# Patient Record
Sex: FEMALE | Race: WHITE | NOT HISPANIC OR LATINO | Employment: FULL TIME | URBAN - METROPOLITAN AREA
[De-identification: names, ages, dates, MRNs, and addresses within clinical notes are randomized per-mention and may not be internally consistent; named-entity substitution may affect disease eponyms.]

---

## 2017-11-10 ENCOUNTER — TRANSCRIBE ORDERS (OUTPATIENT)
Dept: ADMINISTRATIVE | Facility: HOSPITAL | Age: 57
End: 2017-11-10

## 2017-11-10 DIAGNOSIS — Z12.31 VISIT FOR SCREENING MAMMOGRAM: Primary | ICD-10-CM

## 2017-12-22 ENCOUNTER — APPOINTMENT (OUTPATIENT)
Dept: MAMMOGRAPHY | Facility: HOSPITAL | Age: 57
End: 2017-12-22

## 2018-10-16 ENCOUNTER — APPOINTMENT (OUTPATIENT)
Dept: GENERAL RADIOLOGY | Facility: HOSPITAL | Age: 58
End: 2018-10-16

## 2018-10-16 ENCOUNTER — HOSPITAL ENCOUNTER (EMERGENCY)
Facility: HOSPITAL | Age: 58
Discharge: HOME | End: 2018-10-16
Attending: EMERGENCY MEDICINE | Admitting: EMERGENCY MEDICINE

## 2018-10-16 VITALS
OXYGEN SATURATION: 96 % | WEIGHT: 145 LBS | HEIGHT: 63 IN | SYSTOLIC BLOOD PRESSURE: 132 MMHG | BODY MASS INDEX: 25.69 KG/M2 | RESPIRATION RATE: 16 BRPM | TEMPERATURE: 97.7 F | HEART RATE: 69 BPM | DIASTOLIC BLOOD PRESSURE: 78 MMHG

## 2018-10-16 DIAGNOSIS — M25.511 ACUTE PAIN OF RIGHT SHOULDER: Primary | ICD-10-CM

## 2018-10-16 PROCEDURE — 63710000001 PREDNISONE PER 1 MG: Performed by: PHYSICIAN ASSISTANT

## 2018-10-16 PROCEDURE — 99284 EMERGENCY DEPT VISIT MOD MDM: CPT

## 2018-10-16 PROCEDURE — 73030 X-RAY EXAM OF SHOULDER: CPT

## 2018-10-16 RX ORDER — METHYLPREDNISOLONE 4 MG/1
TABLET ORAL
Qty: 21 TABLET | Refills: 0 | Status: SHIPPED | OUTPATIENT
Start: 2018-10-16 | End: 2018-10-21

## 2018-10-16 RX ORDER — TRAMADOL HYDROCHLORIDE 50 MG/1
50 TABLET ORAL EVERY 6 HOURS PRN
Qty: 12 TABLET | Refills: 0 | Status: SHIPPED | OUTPATIENT
Start: 2018-10-16

## 2018-10-16 RX ORDER — PREDNISONE 20 MG/1
60 TABLET ORAL ONCE
Status: COMPLETED | OUTPATIENT
Start: 2018-10-16 | End: 2018-10-16

## 2018-10-16 RX ADMIN — PREDNISONE 60 MG: 20 TABLET ORAL at 08:57

## 2018-10-16 NOTE — ED PROVIDER NOTES
Subjective   Pt is a 57 yo female presenting to ED with right shoulder pain. She explains she has been having pain for the last few days and any movement causes a sharp pain in the front of her shoulder and she is having difficulty lifting her arm. She denies numbness, tingling or weakness to UE. She denies neck pain. She describes a history of intermittent right shoulder pain for about 2 years but never this severe. She can't recall a specific injury but is active lifting her grandchildren. She has never had any imaging of her shoulder or been evaluated by Ortho. She tried taking Motrin with no relief. She has significant hx for HTN and anxiety.         History provided by:  Patient      Review of Systems   Constitutional: Negative for chills and fever.   HENT: Negative for congestion, ear pain, sore throat and trouble swallowing.    Eyes: Negative for pain, redness and visual disturbance.   Respiratory: Negative for cough, chest tightness and shortness of breath.    Cardiovascular: Negative for chest pain and leg swelling.   Gastrointestinal: Negative for abdominal pain, constipation, diarrhea, nausea and vomiting.   Genitourinary: Negative for difficulty urinating, dysuria, flank pain, hematuria and vaginal bleeding.   Musculoskeletal: Positive for arthralgias (Right shoulder ). Negative for back pain and joint swelling.   Skin: Negative for rash and wound.   Neurological: Negative for dizziness, syncope, speech difficulty, weakness, numbness and headaches.   Psychiatric/Behavioral: Negative for confusion.   All other systems reviewed and are negative.      Past Medical History:   Diagnosis Date   • Anxiety    • Hypertension        Allergies   Allergen Reactions   • Levaquin [Levofloxacin] Shortness Of Breath   • Lisinopril Anaphylaxis       Past Surgical History:   Procedure Laterality Date   • BREAST CYST ASPIRATION Left        Family History   Problem Relation Age of Onset   • Breast cancer Neg Hx    • Ovarian  cancer Neg Hx        Social History     Social History   • Marital status:      Social History Main Topics   • Smoking status: Never Smoker   • Smokeless tobacco: Never Used   • Alcohol use No   • Drug use: No   • Sexual activity: Defer     Other Topics Concern   • Not on file           Objective   Physical Exam   Constitutional: She is oriented to person, place, and time. Vital signs are normal. She appears well-developed.   HENT:   Head: Atraumatic.   Nose: Nose normal.   Mouth/Throat: Mucous membranes are normal.   Eyes: Pupils are equal, round, and reactive to light. Conjunctivae, EOM and lids are normal.   Neck: Normal range of motion. Neck supple.   Cardiovascular: Normal rate, regular rhythm and normal heart sounds.    Pulmonary/Chest: Effort normal and breath sounds normal. She has no wheezes.   Abdominal: Soft. She exhibits no distension. There is no tenderness. There is no rebound and no guarding.   Musculoskeletal: She exhibits no edema.        Right shoulder: She exhibits decreased range of motion and tenderness.   Specific TTP at insertion site of proximal biceps tendon of right shoulder   Neurological: She is alert and oriented to person, place, and time. No sensory deficit.   Skin: Skin is warm and dry. No rash noted. No erythema.   Psychiatric: She has a normal mood and affect. Her speech is normal and behavior is normal.   Nursing note and vitals reviewed.      Procedures           ED Course      Discussed results with patient and tx plan. Will dc home on short course of steroids and Tramadol. She will f/u with Ortho and PCP. Sling applied by ED Nurse for comfort and support.     CRISTINA query complete. Treatment plan to include limited course of prescribed  controlled substance. Risks including addiction, benefits, and alternatives presented to patient.       No results found for this or any previous visit (from the past 24 hour(s)).  Note: In addition to lab results from this visit, the labs  "listed above may include labs taken at another facility or during a different encounter within the last 24 hours. Please correlate lab times with ED admission and discharge times for further clarification of the services performed during this visit.    XR Shoulder 2+ View Right   Final Result   No acute finding. Likely calcific rotator tendinopathy.       D:  10/16/2018   E:  10/16/2018       This report was finalized on 10/16/2018 9:58 AM by Louie Fregoso.            Vitals:    10/16/18 0825 10/16/18 0828 10/16/18 0900   BP:  148/77 137/69   Pulse: 78     Resp: 16     Temp: 97.7 °F (36.5 °C)     TempSrc: Oral     SpO2: 99%  97%   Weight: 65.8 kg (145 lb)     Height: 160 cm (63\")       Medications   predniSONE (DELTASONE) tablet 60 mg (60 mg Oral Given 10/16/18 0857)                      MDM      Final diagnoses:   Acute pain of right shoulder            Delia Gross PA  10/16/18 1550    "

## 2018-10-21 ENCOUNTER — HOSPITAL ENCOUNTER (EMERGENCY)
Facility: HOSPITAL | Age: 58
Discharge: HOME | End: 2018-10-21
Attending: EMERGENCY MEDICINE | Admitting: EMERGENCY MEDICINE

## 2018-10-21 VITALS
OXYGEN SATURATION: 96 % | WEIGHT: 145 LBS | HEIGHT: 63 IN | TEMPERATURE: 98.2 F | BODY MASS INDEX: 25.69 KG/M2 | SYSTOLIC BLOOD PRESSURE: 157 MMHG | DIASTOLIC BLOOD PRESSURE: 75 MMHG | HEART RATE: 79 BPM | RESPIRATION RATE: 18 BRPM

## 2018-10-21 DIAGNOSIS — M25.511 ACUTE PAIN OF RIGHT SHOULDER: Primary | ICD-10-CM

## 2018-10-21 PROCEDURE — 96372 THER/PROPH/DIAG INJ SC/IM: CPT

## 2018-10-21 PROCEDURE — 25010000002 KETOROLAC TROMETHAMINE PER 15 MG: Performed by: EMERGENCY MEDICINE

## 2018-10-21 PROCEDURE — 63710000001 PREDNISONE PER 1 MG: Performed by: EMERGENCY MEDICINE

## 2018-10-21 PROCEDURE — 99283 EMERGENCY DEPT VISIT LOW MDM: CPT

## 2018-10-21 RX ORDER — KETOROLAC TROMETHAMINE 30 MG/ML
60 INJECTION, SOLUTION INTRAMUSCULAR; INTRAVENOUS ONCE
Status: COMPLETED | OUTPATIENT
Start: 2018-10-21 | End: 2018-10-21

## 2018-10-21 RX ORDER — PREDNISONE 20 MG/1
TABLET ORAL
Qty: 15 TABLET | Refills: 0 | Status: SHIPPED | OUTPATIENT
Start: 2018-10-21

## 2018-10-21 RX ORDER — CYCLOBENZAPRINE HCL 10 MG
10 TABLET ORAL 3 TIMES DAILY PRN
Qty: 15 TABLET | Refills: 0 | Status: SHIPPED | OUTPATIENT
Start: 2018-10-21

## 2018-10-21 RX ORDER — PREDNISONE 20 MG/1
60 TABLET ORAL ONCE
Status: COMPLETED | OUTPATIENT
Start: 2018-10-21 | End: 2018-10-21

## 2018-10-21 RX ADMIN — PREDNISONE 60 MG: 20 TABLET ORAL at 11:07

## 2018-10-21 RX ADMIN — KETOROLAC TROMETHAMINE 60 MG: 30 INJECTION, SOLUTION INTRAMUSCULAR at 11:08

## 2018-10-21 NOTE — ED PROVIDER NOTES
"Subjective   MsAyesha Chaidez is a 58 year old female who presents to the ED with c/o recurrent shoulder pain. Patient reports that she has been experiencing right shoulder pain intermittently for the past 2 years since a monkey bar injury but worse for approximately the past week and a half. Was evaluated here on 10/16 (please see note by MARIZA Ingram) where an x-ray revealed largely non-acute findings except for some likely calcific rotator tendinopathy. She was discharged home on Tramadol and steroids and planned to follow up with orthopedics. Patient laments that despite taking the medications she was prescribed her pain has largely persisted. She states that the only pain relief she has gotten is with an injection she received at her orthopedist's office a couple days ago, but notes that it was only temporary and that her pain has unfortunately returned. Presents this morning complaining of the same pain as before (sharp right shoulder pain radiating down her arm and into her clavicle) requesting the same injection she got in office. Denies any other acute accompanying symptoms.        History provided by:  Patient  Upper Extremity Issue   Location:  Shoulder  Shoulder location:  R shoulder  Pain details:     Radiates to:  R arm    Severity:  Severe  Dislocation: no    Prior injury to area:  Yes (2 years)  Relieved by: \"injection in office\"  Exacerbated by: ranging, palpation.  Associated symptoms: decreased range of motion (secondary to pain)    Associated symptoms: no back pain, no fever and no numbness        Review of Systems   Constitutional: Negative for chills and fever.   Respiratory: Negative for cough and shortness of breath.    Cardiovascular: Negative for chest pain and palpitations.   Gastrointestinal: Negative for abdominal pain, nausea and vomiting.   Musculoskeletal: Positive for arthralgias and myalgias. Negative for back pain.   Neurological: Negative for numbness.   All other systems " reviewed and are negative.      Past Medical History:   Diagnosis Date   • Anxiety    • Hypertension        Allergies   Allergen Reactions   • Levaquin [Levofloxacin] Shortness Of Breath   • Lisinopril Anaphylaxis       Past Surgical History:   Procedure Laterality Date   • BREAST CYST ASPIRATION Left        Family History   Problem Relation Age of Onset   • Breast cancer Neg Hx    • Ovarian cancer Neg Hx        Social History     Social History   • Marital status:      Social History Main Topics   • Smoking status: Never Smoker   • Smokeless tobacco: Never Used   • Alcohol use No   • Drug use: No   • Sexual activity: Defer     Other Topics Concern   • Not on file         Objective   Physical Exam   Constitutional: She is oriented to person, place, and time. She appears well-developed and well-nourished. No distress.   HENT:   Head: Normocephalic and atraumatic.   Eyes: Conjunctivae are normal. No scleral icterus.   Neck: Normal range of motion. Neck supple.   Cardiovascular: Normal rate, regular rhythm, normal heart sounds and intact distal pulses.  Exam reveals no gallop and no friction rub.    No murmur heard.  Pulmonary/Chest: Effort normal and breath sounds normal. No respiratory distress. She has no wheezes. She has no rales.   Abdominal: Soft. Bowel sounds are normal. There is no tenderness. There is no guarding.   Musculoskeletal:        Right shoulder: She exhibits tenderness.   Tenderness over the right deltoid extending into the muscles of the chest wall and down the right arm. Her right hand is pink, warm, and well-perfused.   Neurological: She is alert and oriented to person, place, and time.   Skin: Skin is warm and dry. She is not diaphoretic.   Psychiatric: Her behavior is normal. Her mood appears anxious.   Patient is anxious and crying at times.   Nursing note and vitals reviewed.      Procedures         ED Course  ED Course as of Oct 21 1112   Sun Oct 21, 2018   1055 I spoke with   Chaidez about medication to treat this and she tells me she is planning on driving home and cannot get anyone to pick her up.  She asked that I inject her joint as it seemed to help when her orthopedist at  did that a few days ago.  I advised her that is not a procedure we do in the emergency department and would require an orthopedic surgeon.  She tells me to call one in and I advised her that they would not do that. She tells me she did get some relief initially with the Medrol Dosepak.  I will give her a higher dose of prednisone.  She tells me she did take a single ibuprofen 200 mg pill today and it hasn't helped much.  I will give her Toradol as well as 60 mg of prednisone.  Regarding the tramadol she tells me it just makes her sleepy and she doesn't wish to have any stronger narcotic than that.  Some of the pain she is having seems to be muscular and I think a muscle relaxer may help.  I will prescribe that and have advised her that it may make her sleepy.  I urged her to call her orthopedist at  in the morning and request close follow-up  [DT]      ED Course User Index  [DT] Lavelle Alejo MD                     [facility]    Final diagnoses:   Acute pain of right shoulder       Documentation assistance provided by turner Verdugo.  Information recorded by the turner was done at my direction and has been verified and validated by me.     Cliff Verdugo  10/21/18 1112       Lavelle Alejo MD  10/26/18 [id number]

## 2018-10-21 NOTE — DISCHARGE INSTRUCTIONS
Don't drive while taking either the tramadol or the muscle relaxer I have prescribed as they will make you sleepy . Take 3 ibuprofen 3 times a day on a full stomach.  Call your orthopedic doctor at  in the morning and ask for close follow-up.